# Patient Record
(demographics unavailable — no encounter records)

---

## 2024-10-18 NOTE — CHIEF COMPLAINT
[Questionnaire Received] : Patient questionnaire received [Falling Pelvic Organs] : falling pelvic organs [Urine Frequency] : urine frequency [Poor Bladder Control] : poor bladder control

## 2024-10-18 NOTE — COUNSELING
[FreeTextEntry1] : Today's July 22, 2024 update postoperative exam status post total vaginal hysterectomy with colpocleisis and perineorrhaphy April 16, 2024 she has a history of neuropathy venous stasis status post a stent, left ankle ulcer, with lymphedema comes in for checkup today the pathology on the surgery was benign her ovaries are in situ and recent pelvic and abdominal sonogram revealed mild residual right hydronephrosis no ovaries visualized no pelvic or abdominal masses visualized  She reports mild lower abdominal pain and lower back discomfort since surgery with the?  Of some mild urge incontinence using many pads per day with free flow normal postvoid residual she has tried medications including Gemtesa in the past.  She is not in a position to come in for weekly posterior tibial nerve stimulation either at this point we can continue to try medications the patient declines this today she said she would like to wait till the next visit and see how she does with the leakage  She reported some vaginal bump however aside from a tiny ingrown hair on the left labia majora there was no lesion or bump or abnormality seen and she will continue using her vaginal estrogen cream once or twice a week as prescribed by Dr. Boothe  She is due for mammogram and was given this prescription today  Her POCT is negative and she has no signs and symptoms of UTI  She has a thyroid sonogram which revealed multinodular goiter and her surgical oncologist would like to speak with her and this was reiterated to the patient to give the oncologist to call  She was given an appointment to see me before the end of the year and I have reached out to her primary care provider her geriatrician to give me a call as the patient has cognitive decline lives with her daughter who is also dealing with a psychiatric issue and [place] and she has not seen him very much.  I believe the care plan is in order and I will speak to her PCP Dr. Combs and discuss this with her further JMR

## 2024-10-18 NOTE — REVIEW OF SYSTEMS
[Feeling Tired] : feeling tired [Eyesight Problems] : eyesight problems [All Other ROS] : all other reviewed systems are negative

## 2024-10-18 NOTE — HISTORY OF PRESENT ILLNESS
[Cystocele (Obstetric)] : severe [Uterine Prolapse] : moderate [Vaginal Wall Prolapse] : no [Rectal Prolapse] : no [Stress Incontinence] : no [Urge Incontinence Of Urine] : no [Unable To Restrain Bowel Movement] : mild [x3+] : nocturia three or more  times a night [Urinary Stream Starts And Stops] : no [Incomplete Emptying Of Bladder] : no [Urinary Frequency] : no [Feelings Of Urinary Urgency] : no [Pain During Urination (Dysuria)] : no [Urinary Tract Infection] : no [Hematuria] : no [Constipation Obstructed Defecation] : moderate [Incomplete Emptying Of Stool] : no [Stool Visible Blood] : no [Diarrhea] : no [Pelvic Pain] : no [Vaginal Pain] : no [Vulvar Pain] : no [Bladder Pain] : no [Rectal Pain] : no [Back Pain] : no [Sexual Dysfunction, NOS] : no [] : no [FreeTextEntry1] : 6/19/23\par  pop sx \par  constipation uses senna 2 bid AND STOOL SOFTENER\par  and tries to stay off of her feet/and hydrated, TAMSULOSIN FROM DR COSTELLO TO TRY AND KEEP PVR LOW 0.4 MG\par  has been dealing w/leg wound/infection x mos \par  \par  has new lump in neck L side and\par  back pain?MSK vs hydro from pop?\par  \par  has not wanted to do anything up to now and including now re pessary/pt/surg last choice due to med comorbidities and travel issues\par  \par  \par  \par

## 2024-10-18 NOTE — DISCUSSION/SUMMARY
[FreeTextEntry1] : Sent: Friday, October 6, 2023 11:49 AM To: Geetha Combs <[email or web address] [email or web address]>; Chris Saleh <[email or web address] [email or web address]> Cc: Alma Wilson <[email or web address] [email or web address]>; Amanuel Boothe <[email or web address] [email or web address]>; Enma Ahumada <[email or web address]>; Shannan Garcia <[email or web address] [email or web address]>; Kenroy Coughlin <[email or web address] [place]> Subject: PHI secure re mutual patient   Good morning! I will see our mutual patient 4pm this Monday 10/9 and have a few questions: 1-assume from notes that home care is doing goldstein checks and changes? (Dr. Combs and Alma) 2-Cipro at this point should I think be discontinued/asking Dr. Boothe his thoughts re daily low dose trimethoprim OR menthenamine (Dr. Boothe?) 3-will revisit pessary option with her on Monday, if she accepts this will necessitate monthly visits to try and reduce chance of erosion formation (a big issue for her in past both formation and her travel to my office) 4-Dr. Saldaña-is patient on housecalls program yet or at least waiting list?  ***Mayra/Kenroy: will one of you remind patient of her 4pm appointment with me 10/9 and lmk her response-thank you!

## 2024-10-18 NOTE — PHYSICAL EXAM
[No Acute Distress] : in no acute distress [Well developed] : well developed [Well Nourished] : ~L well nourished [Good Hygeine] : demonstrates good hygeine [Oriented x3] : oriented to person, place, and time [Normal Memory] : ~T memory was ~L unimpaired [Normal Mood/Affect] : mood and affect are normal [Normal Lung Sounds] : the lungs were clear to auscultation [Respirations regular] : ~T respiratory rate was regular [Rate & Rhythm Regular] : ~T heart rate and rhythm were normal [No Edema] : ~T edema was not present [Supple] : ~T the neck demonstrated no ~M decrease in suppleness [Thyroid Normal] : the thyroid ~T showed no abnormalities [Symmetrical] : the neck was ~L symmetrical [Mass] : no breast mass [Tender] : no tenderness [Nipple Discharge] : no nipple discharge [Mass (___ Cm)] : no ~M [unfilled] abdominal mass was palpated [H/Smegaly] : no hepatosplenomegaly [Supraclavicular LAD] : no adenopathy noted in supraclavicular lymph nodes [Axillary LAD] : no adenopathy was noted in axillary nodes [Inguinal LAD] : no adenopathy was noted in the inguinal lymph nodes [Warm and Dry] : was warm and dry to touch [Turgor Normal] : skin turgor ~T was normal [Rash/Lesion] : no rash or lesion was noted [Normal Gait] : gait was normal [No Joint Swelling] : there was no joint swelling [No Clubbing, Cyanosis] : no clubbing or cyanosis of the fingernails [Normal Strength/Tone] : muscle strength and tone were normal [Vulvar Atrophy] : vulvar atrophy [Labia Majora] : were normal [Labia Minora] : were normal [Bartholin's Gland] : both Bartholin's glands were normal  [Normal Appearance] : general appearance was normal [Atrophy] : atrophy [Estrogen Effect] : estrogen effect was observed [Tenderness] : tenderness [Rectocele] : a rectocele [Cystocele] : a cystocele [No Bleeding] : there was no active vaginal bleeding [2] : 2 [Aa ____] : Aa [unfilled] [Ba ____] : Ba [unfilled] [C ____] : C [unfilled] [GH ____] : GH [unfilled] [PB ____] : PB [unfilled] [TVL ____] : TVL  [unfilled] [Ap ____] : Ap [unfilled] [Bp ____] : Bp [unfilled] [D ____] : D [unfilled] [] : 0 [Absent] : absent [Uterine Adnexae] : were not tender and not enlarged [Normal rectal exam] : was normal [Normal] : was normal [None] : no

## 2024-11-25 NOTE — HISTORY OF PRESENT ILLNESS
[] : no [FreeTextEntry1] : 6/19/23\par  pop sx \par  constipation uses senna 2 bid AND STOOL SOFTENER\par  and tries to stay off of her feet/and hydrated, TAMSULOSIN FROM DR COSTELLO TO TRY AND KEEP PVR LOW 0.4 MG\par  has been dealing w/leg wound/infection x mos \par  \par  has new lump in neck L side and\par  back pain?MSK vs hydro from pop?\par  \par  has not wanted to do anything up to now and including now re pessary/pt/surg last choice due to med comorbidities and travel issues\par  \par  \par  \par

## 2024-11-25 NOTE — DISCUSSION/SUMMARY
[FreeTextEntry1] : Sent: Friday, October 6, 2023 11:49 AM To: Geetha Combs <[email or web address]>; Chris Saleh <[email or web address]> Cc: Alma Wilson <[email or web address]>; Amanuel Boothe <[email or web address]>; Enma Ahumada <[email or web address]>; Shannan Garcia <[email or web address]>; Kenroy Coughlin <[email or web address]> Subject: PHI secure re mutual patient   Good morning! I will see our mutual patient 4pm this Monday 10/9 and have a few questions: 1-assume from notes that home care is doing goldstein checks and changes? (Dr. Combs and Alma) 2-Cipro at this point should I think be discontinued/asking Dr. Boothe his thoughts re daily low dose trimethoprim OR menthenamine (Dr. Boothe?) 3-will revisit pessary option with her on Monday, if she accepts this will necessitate monthly visits to try and reduce chance of erosion formation (a big issue for her in past both formation and her travel to my office) 4-Dr. Saldaña-is patient on housecalls program yet or at least waiting list?  ***Mayra/Kenroy: will one of you remind patient of her 4pm appointment with me 10/9 and lmk her response-thank you!

## 2024-11-25 NOTE — PHYSICAL EXAM
[04181] : A chaperone was present during the pelvic exam. [FreeTextEntry2] : surinder diaz [Mass] : no breast mass [Tender] : no tenderness [Nipple Discharge] : no nipple discharge [Mass (___ Cm)] : no ~M [unfilled] abdominal mass was palpated [Tenderness] : ~T ~M abdominal tenderness observed [Distended] : not distended [H/Smegaly] : no hepatosplenomegaly [Hernia] : no hernia observed [Scar] : no scars [Supraclavicular LAD] : no adenopathy noted in supraclavicular lymph nodes [Axillary LAD] : no adenopathy was noted in axillary nodes [Inguinal LAD] : no adenopathy was noted in the inguinal lymph nodes [Rash/Lesion] : no rash or lesion was noted

## 2024-12-20 NOTE — HISTORY OF PRESENT ILLNESS
[No falls in past year] : Patient reported no falls in the past year [Patient is independent with] : bathing [Independent] : managing finances [] : Assistance needed doing laundry [NO] : No [0] : 2) Feeling down, depressed, or hopeless: Not at all (0) [PHQ-2 Negative - No further assessment needed] : PHQ-2 Negative - No further assessment needed [FreeTextEntry1] : VEL SINGH is an 85-year-old female with Hx of bladder prolapse, pre-diabetes, peripheral neuropathy, LLE peripheral vascular disease s/p LFA/SFA stents (on daily ASA), LLE venous stasis c/b chronic wound with ongoing debridement, and diverticulosis, recurrent UTI's and pelvic prolapse s/p repair.   Patient comes for a follow up she complains of overall debility; she has been more bedbound over the past couple of months. Her neighbor has been cooking for her and her daughter. She continues to struggle with memory changes and concentration. She was referred to home PT but does not like to have other people in her home. She has worsening abdominal pain and continues to pass "small francisco of stool" does not take stool softeners consistently.   She continues to have bilateral hand tremor; is taking Propranolol 10mg daily. She did not take it today, because she forgot.   # Cognitive impairment: - noticed worsening changes in her memory since her recent hospital admission, MoCA test was done during hospital admission showing progression of memory changes from 23/30 (test on 8/22/23 in the office) vs 12/30 (test Nov 2023).  - She was advised to see neurology for follow up but never followed up.  - Her daughter Rekha (Dimitrios) is no longer able to help due to her own medical problems  - Son Dyllan who lives in [place] is aware of concerns cognitively and has offered to take pt to live with him, however pt continues to refuse and states "God will provide". Discussed during visit today (over the phone) concern of pts decline and to consider applying for Medicaid. Office SW spoke with pt and son at length.  - SW has provided with community resources however pt needs more assistance at home HHA vs family support. Counseling provided to pt and son again.  - Was recommended to come to current visit but reports "unable to come due to health problems of his own".   # Neuropathy: - She reported has occasional pain on her legs described as sharp pain likely 2/2 chronic neuropathy (on Gabapentin in the past) - was RX tramadol but unsure if her pain was relieved after taking.   # Abdominal bloating: - She also complains of abdominal bloating and occasional gas, taking daily Pantoprazole in the AM.  - She takes Colace sometimes - she reports persistent "pebble stool"   Current Medications	 acetaminophen 500 mg oral tablet: 2 tab(s) orally every 12 hours as needed for pain.  Aspirin Enteric Coated 81 mg oral delayed release tablet: 1 tab(s) orally once a day Propranolol 10mg oral tablet: 1 tab orally once a day Lexapro 5 mg oral tablet: 1 tab(s) orally once a day pantoprazole 40 mg oral delayed release tablet: 1 orally once a day Colace 100mg 1 tablet every 8 hours as needed for constipation.   She remains on the waiting list for house calls program.   Contact son Dyllan [phone] (lives in [place])  [KDE2Ciota] : 0

## 2024-12-20 NOTE — ASSESSMENT
[FreeTextEntry1] : # Bradycardia - HR 49 on VS - EKG done showing type 1 AV block, HR in 50's (similar to prior EKG) - will change from BB to ARB in case bradycardia contributing to low energy level - written instructions given to patient and teach back   # HTN - did not take antihypertensive because she hasn't eaten breakfast - clarified can take medication on empty stomach - changed propranolol to Losartan 25mg daily - written instructions given to patient and teach back provided   # Essential tremor - affecting pts quality of life specially with eating, advised to use heavier silverware to help with hand stability  - will remove BB due to bradycardia - discussed essential tremor may come back but will monitor for now   # Memory changes - progression from mild to moderate cognitive impairment - c/w Lexapro 5mg daily for pseudodementia component (unable to tolerate Cymbalta due to worsening urinary retention) - discussed today again with pt and jay Mijares (over the phone) ongoing concern of overall self-care and worsening mental and physical decline. He was supposed to be here but unable to come from [place] due to health problems of his own  - would benefit from Medicaid application for HHA support at home, resources provided to jay Mijares on last visit and again today  - adult protective services involved by hospital team, explained to me by office SW this service needs to be voluntary accepted and worry pt lacks capacity to understand need - on waiting list for house calls program since pt should not be driving, today pt came on her own   # Neuropathy - currently controlled with Tylenol - will hold off on Gabapentin due to changes in mental status - last HbA1C controlled - will repeat HbA1C on next visit since she refused home draws   # Hyperlipidemia - elevated cholesterol  - requested to monitor for now and will make diet changes  - EKG with no changes  # Constipation - discussed importance of taking stool softener to help with constipation - c/w Colace twice a day - last BM yesterday but "reported as francisco"   # Abdominal cramping - likely multifactorial (constipation, pelvic disease and diet changes) - c/w Pantoprazole 40mg daily and Colace as needed - she is interested in seeing GI - prior imaging stable, possible related to uncontrolled constipation   # Depression - mood stable today - c/w Lexapro 5mg daily and will adjust as needed  # Debility - referred to home PT, but refused - feels she can do exercises on her own  #HCM - mammogram and instructions on were to call to make an appointment discussed with pt - Influenza vaccine given   - written instructions given - f/u in 1 months or earlier if needed for BP monitoring

## 2024-12-20 NOTE — PHYSICAL EXAM
[Alert] : alert [Sclera] : the sclera and conjunctiva were normal [PERRL] : pupils were equal in size, round, and reactive to light [Normal Oral Mucosa] : normal oral mucosa [Normal Outer Ear/Nose] : the ears and nose were normal in appearance [Both Tympanic Membranes Were Examined] : both tympanic membranes were normal [Normal Appearance] : the appearance of the neck was normal [Supple] : the neck was supple [No Respiratory Distress] : no respiratory distress [No Acc Muscle Use] : no accessory muscle use [Respiration, Rhythm And Depth] : normal respiratory rhythm and effort [Auscultation Breath Sounds / Voice Sounds] : lungs were clear to auscultation bilaterally [Normal S1, S2] : normal S1 and S2 [Heart Rate And Rhythm] : heart rate was normal and rhythm regular [Edema] : edema was not present [Pedal Pulses Normal] : the pedal pulses are present [Bowel Sounds] : normal bowel sounds [Abdomen Tenderness] : non-tender [No CVA Tenderness] : no CVA  tenderness [Abdomen Soft] : soft [No Clubbing, Cyanosis] : no clubbing or cyanosis of the fingernails [Involuntary Movements] : no involuntary movements were seen [No Focal Deficits] : no focal deficits [Motor Tone] : muscle strength and tone were normal [Oriented To Time, Place, And Person] : oriented to person, place, and time [Normal Affect] : the affect was normal [de-identified] : goldstein catheter in place and patent. Using leg bag. Urine is clear and yellow [de-identified] : LLE ulcer; no evidence of infection [MentalStatusTotal] : 23/30

## 2024-12-20 NOTE — REVIEW OF SYSTEMS
[Feeling Poorly] : feeling poorly [Feeling Tired] : feeling tired [Abdominal Pain] : abdominal pain [Constipation] : constipation [Dysuria] : dysuria [Incontinence] : incontinence [Vaginal Discharge] : vaginal discharge [Abn Vaginal Bleeding] : unexplained vaginal bleeding [As Noted in HPI] : as noted in HPI [Skin Wound] : skin wound [Depression] : depression [Fever] : no fever [Chills] : no chills [Discharge From Eyes] : no purulent discharge from the eyes [Nasal Discharge] : no nasal discharge [Sore Throat] : no sore throat [Chest Pain] : no chest pain [Palpitations] : no palpitations [Shortness Of Breath] : no shortness of breath [Wheezing] : no wheezing [Cough] : no cough [SOB on Exertion] : no shortness of breath during exertion [Vomiting] : no vomiting [Diarrhea] : no diarrhea [Limb Pain] : no limb pain [Dizziness] : no dizziness [Anxiety] : no anxiety [Easy Bleeding] : no tendency for easy bleeding [Easy Bruising] : no tendency for easy bruising

## 2024-12-26 NOTE — REASON FOR VISIT
[Initial Evaluation] : an initial evaluation [FreeTextEntry1] : Early satiety, Abdominal pain, Chronic constipation

## 2024-12-26 NOTE — REVIEW OF SYSTEMS
[As Noted in HPI] : as noted in HPI [Skin Wound] : skin wound [Negative] : Heme/Lymph [de-identified] : memory loss [FreeTextEntry9] : Lower extremity with ulcers

## 2024-12-26 NOTE — ASSESSMENT
[FreeTextEntry1] : Patient with complaints of early satiety and postprandial abdominal discomfort.  Pantoprazole will be stopped and replaced with esomeprazole.  She may need to have an abdominal sonogram or CAT scan of the abdomen. Patient also has chronic constipation.  She will take Colace once to twice a day and we will add lactulose 1 tablespoon twice daily.  FOBT will be sent to the lab.  Patient will be seen in follow-up in 2 months.  She will call if her symptoms persist.

## 2024-12-26 NOTE — HISTORY OF PRESENT ILLNESS
[FreeTextEntry1] : Patient is a 85-year-old female who is status post partial hysterectomy in 4/2024.  She has a history of colonoscopy and upper endoscopy done in 2020.  She did have a hiatus hernia and some gastritis.  Colonoscopy revealed a small polyp that was removed. Patient has been complaining of region symptoms of early satiety and some postprandial discomfort.  She has been on pantoprazole without relief of her symptoms. She also has a history of chronic constipation.  She is to take Colace as needed and was recently told to take it on a daily basis.  She did not have relief with MiraLAX in the past. She is being treated for leg ulcers.  She does have some memory loss.